# Patient Record
Sex: FEMALE | Race: WHITE | Employment: FULL TIME | URBAN - METROPOLITAN AREA
[De-identification: names, ages, dates, MRNs, and addresses within clinical notes are randomized per-mention and may not be internally consistent; named-entity substitution may affect disease eponyms.]

---

## 2021-01-29 ENCOUNTER — APPOINTMENT (OUTPATIENT)
Dept: GENERAL RADIOLOGY | Age: 31
End: 2021-01-29
Attending: PHYSICIAN ASSISTANT
Payer: OTHER GOVERNMENT

## 2021-01-29 ENCOUNTER — HOSPITAL ENCOUNTER (EMERGENCY)
Age: 31
Discharge: HOME | End: 2021-01-29
Attending: EMERGENCY MEDICINE
Payer: OTHER GOVERNMENT

## 2021-01-29 VITALS
SYSTOLIC BLOOD PRESSURE: 139 MMHG | RESPIRATION RATE: 18 BRPM | OXYGEN SATURATION: 96 % | WEIGHT: 155 LBS | HEART RATE: 103 BPM | HEIGHT: 64 IN | TEMPERATURE: 97.9 F | DIASTOLIC BLOOD PRESSURE: 83 MMHG | BODY MASS INDEX: 26.46 KG/M2

## 2021-01-29 DIAGNOSIS — R06.00 DYSPNEA, UNSPECIFIED TYPE: Primary | ICD-10-CM

## 2021-01-29 DIAGNOSIS — Z20.822 PERSON UNDER INVESTIGATION FOR COVID-19: ICD-10-CM

## 2021-01-29 LAB
ANION GAP SERPL CALC-SCNC: ABNORMAL MMOL/L (ref 3–18)
BASOPHILS # BLD: 0 K/UL (ref 0–0.1)
BASOPHILS NFR BLD: 0 % (ref 0–2)
BUN SERPL-MCNC: 16 MG/DL (ref 7–18)
BUN/CREAT SERPL: 18 (ref 12–20)
CALCIUM SERPL-MCNC: 8.8 MG/DL (ref 8.5–10.1)
CHLORIDE SERPL-SCNC: 106 MMOL/L (ref 100–111)
CO2 SERPL-SCNC: 35 MMOL/L (ref 21–32)
CREAT SERPL-MCNC: 0.88 MG/DL (ref 0.6–1.3)
D DIMER PPP FEU-MCNC: <0.27 UG/ML(FEU)
DIFFERENTIAL METHOD BLD: NORMAL
EOSINOPHIL # BLD: 0.2 K/UL (ref 0–0.4)
EOSINOPHIL NFR BLD: 2 % (ref 0–5)
ERYTHROCYTE [DISTWIDTH] IN BLOOD BY AUTOMATED COUNT: 12.1 % (ref 11.6–14.5)
GLUCOSE SERPL-MCNC: 88 MG/DL (ref 74–99)
HCT VFR BLD AUTO: 42.9 % (ref 35–45)
HGB BLD-MCNC: 14.6 G/DL (ref 12–16)
LIPASE SERPL-CCNC: 103 U/L (ref 73–393)
LYMPHOCYTES # BLD: 2.2 K/UL (ref 0.9–3.6)
LYMPHOCYTES NFR BLD: 28 % (ref 21–52)
MCH RBC QN AUTO: 30.7 PG (ref 24–34)
MCHC RBC AUTO-ENTMCNC: 34 G/DL (ref 31–37)
MCV RBC AUTO: 90.1 FL (ref 74–97)
MONOCYTES # BLD: 0.7 K/UL (ref 0.05–1.2)
MONOCYTES NFR BLD: 9 % (ref 3–10)
NEUTS SEG # BLD: 4.6 K/UL (ref 1.8–8)
NEUTS SEG NFR BLD: 61 % (ref 40–73)
PLATELET # BLD AUTO: 224 K/UL (ref 135–420)
PMV BLD AUTO: 10.3 FL (ref 9.2–11.8)
POTASSIUM SERPL-SCNC: 3.7 MMOL/L (ref 3.5–5.5)
RBC # BLD AUTO: 4.76 M/UL (ref 4.2–5.3)
SARS-COV-2, COV2: NORMAL
SODIUM SERPL-SCNC: 140 MMOL/L (ref 136–145)
WBC # BLD AUTO: 7.6 K/UL (ref 4.6–13.2)

## 2021-01-29 PROCEDURE — 93005 ELECTROCARDIOGRAM TRACING: CPT

## 2021-01-29 PROCEDURE — 83690 ASSAY OF LIPASE: CPT

## 2021-01-29 PROCEDURE — 71046 X-RAY EXAM CHEST 2 VIEWS: CPT

## 2021-01-29 PROCEDURE — 85025 COMPLETE CBC W/AUTO DIFF WBC: CPT

## 2021-01-29 PROCEDURE — U0003 INFECTIOUS AGENT DETECTION BY NUCLEIC ACID (DNA OR RNA); SEVERE ACUTE RESPIRATORY SYNDROME CORONAVIRUS 2 (SARS-COV-2) (CORONAVIRUS DISEASE [COVID-19]), AMPLIFIED PROBE TECHNIQUE, MAKING USE OF HIGH THROUGHPUT TECHNOLOGIES AS DESCRIBED BY CMS-2020-01-R: HCPCS

## 2021-01-29 PROCEDURE — 99284 EMERGENCY DEPT VISIT MOD MDM: CPT

## 2021-01-29 PROCEDURE — 80048 BASIC METABOLIC PNL TOTAL CA: CPT

## 2021-01-29 PROCEDURE — 85379 FIBRIN DEGRADATION QUANT: CPT

## 2021-01-29 NOTE — Clinical Note
[facility] [place] 
[facility] EMERGENCY DEPT 
[address] [address] [phone] Work/School Note Date: 1/29/2021 To Whom It May concern: 
 
Radha Davis was evaulated by the following provider(s): 
Attending Provider: Carmela Bearden DO Physician Assistant: Harjeet Galindo virus is suspected. Per the CDC guidelines we recommend home isolation until the following conditions are all met: 1. At least 10 days have passed since symptoms first appeared and 2. At least 24 hours have passed since last fever without the use of fever-reducing medications and 
3. Symptoms (e.g., cough, shortness of breath) have improved Sincerely, MIKY Campbell

## 2021-01-30 ENCOUNTER — PATIENT OUTREACH (OUTPATIENT)
Dept: CASE MANAGEMENT | Age: 31
End: 2021-01-30

## 2021-01-30 LAB
ATRIAL RATE: 79 BPM
CALCULATED P AXIS, ECG09: 67 DEGREES
CALCULATED R AXIS, ECG10: 57 DEGREES
CALCULATED T AXIS, ECG11: 41 DEGREES
DIAGNOSIS, 93000: NORMAL
P-R INTERVAL, ECG05: 142 MS
Q-T INTERVAL, ECG07: 388 MS
QRS DURATION, ECG06: 100 MS
QTC CALCULATION (BEZET), ECG08: 444 MS
VENTRICULAR RATE, ECG03: 79 BPM

## 2021-01-30 NOTE — PROGRESS NOTES
Patient contacted regarding [id number] Suspect. Discussed COVID-19 related testing which was pending at this time. Test results were pending. Patient informed of results, if available? n/a. Outreach made within 2 business days of discharge: Yes    LPN Care Coordinator contacted the patient by telephone to perform post discharge assessment. Verified name and  with patient as identifiers. Provided introduction to self, and explanation of LPN Care Coordinator role, and reason for call due to risk factors for infection and/or exposure to COVID-19. Symptoms reviewed with patient who verbalized the following symptoms: no new symptoms and no worsening symptoms. Denies SOB, chest pain, cough, chills, fever, body aches, wheezing, lightheadness/dizziness,HA, n/v/d at this time. Due to no new or worsening symptoms encounter was not routed to provider for escalation. Discussed follow-up appointments. If no appointment was previously scheduled, appointment scheduling offered: yes  [facility] follow up appointment(s): No future appointments. Non-[facility] follow up appointment(s): n/a    Patient encouraged to make an appointment with PCP for f/up. Advised to return to ED if symptoms worsen or fail to improve. Patients acknowledges understanding. Advance Care Planning:   Does patient have an Advance Directive: currently not on file; education provided     Patient has following risk factors of: no known risk factors     LPN reviewed discharge instructions, medical action plan and red flags such as increased shortness of breath, increasing fever and signs of decompensation with patient who verbalized understanding. Discussed exposure protocols and quarantine with CDC Guidelines What to do if you are sick with coronavirus disease .  Patient was given an opportunity for questions and concerns.  The patient agrees to contact the [facility] exposure line [phone], [facility] department JUAN M Acharya ([phone]) and PCP office for questions related to their healthcare. LPN provided contact information for future needs. Reviewed and educated patient on any new and changed medications related to discharge diagnosis. Patient/family/caregiver given information for [employer] and agrees to enroll yes  Patient's preferred e-mail:  n/a  Patient's preferred phone number: [phone]   Based on Loop alert triggers, patient will be contacted by nurse care manager for worsening symptoms. Pt will be further monitored by COVID Loop Team based on severity of symptoms and risk factors.

## 2021-01-30 NOTE — ACP (ADVANCE CARE PLANNING)
Advance Care Planning:   Does patient have an Advance Directive: currently not on file; education provided no

## 2021-01-30 NOTE — ED TRIAGE NOTES
Patient reports SOB for 2 weeks, worsening over the past 2 days.  Patient states she was seen at [facility] today for the same symptoms

## 2021-01-30 NOTE — ED PROVIDER NOTES
EMERGENCY DEPARTMENT HISTORY AND PHYSICAL EXAM    Date: 2021  Patient Name: Renzo Pérez    History of Presenting Illness     Chief Complaint   Patient presents with    Shortness of Breath         History Provided By: Patient    Chief Complaint: sob      Additional History (Context):   8:17 PM  Renzo Pérez is a 26 y.o. female presents to the emergency department C/O shortness of breath that she has noticed for the past 2 weeks worse over the past 2 days. Denies chest pain, cough, exposure to Covid, leg or calf tenderness or swelling. Patient states she was seen at [facility] earlier today but was not completely satisfied with her care and would like a second opinion. Specifically concerned about PE and would like a D-dimer. Also mentions that she did not have a chest x-ray or EKG. PCP: None        Past History     Past Medical History:  Past Medical History:   Diagnosis Date    Congenital adrenal cortical hyperplasia ([facility] [address]        Past Surgical History:  History reviewed. No pertinent surgical history. Family History:  History reviewed. No pertinent family history. Social History:  Social History     Tobacco Use    Smoking status: Former Smoker     Quit date: 2013     Years since quittin.0    Smokeless tobacco: Never Used   Substance Use Topics    Alcohol use: Not Currently    Drug use: Never       Allergies: Allergies   Allergen Reactions    Clindamycin Rash    Sulfa (Sulfonamide Antibiotics) Rash    Valtrex [Valacyclovir] Itching       Review of Systems   Review of Systems   Constitutional: Negative for chills and fever. Respiratory: Positive for shortness of breath. Cardiovascular: Negative for chest pain, palpitations and leg swelling. Gastrointestinal: Negative for abdominal pain. Musculoskeletal: Negative for back pain. Neurological: Negative for weakness and numbness. All other systems reviewed and are negative.       Physical Exam     Vitals:    21 2005 01/29/21 2039 01/29/21 2207   BP: 139/83     Pulse: (!) 103     Resp: 18     Temp: 97.9 °F (36.6 °C)     SpO2: 100% 98% 96%   Weight: 70.3 kg (155 lb)     Height: 5' 4\" (1.626 m)       Physical Exam  Vitals signs and nursing note reviewed. Constitutional:       Appearance: She is well-developed. Comments: alert, Well-appearing, nontoxic, no acute distress   HENT:      Head: Normocephalic and atraumatic. Neck:      Musculoskeletal: Normal range of motion and neck supple. Cardiovascular:      Rate and Rhythm: Normal rate and regular rhythm. Heart sounds: Normal heart sounds. No murmur. Pulmonary:      Effort: Pulmonary effort is normal. No respiratory distress. Breath sounds: Normal breath sounds. No wheezing or rales. Comments: Clear to auscultation bilaterally  Abdominal:      General: Bowel sounds are normal.      Palpations: Abdomen is soft. Tenderness: There is no abdominal tenderness. Musculoskeletal:      Comments: No calf tenderness or swelling bilaterally   Neurological:      Mental Status: She is alert and oriented to person, place, and time. Psychiatric:         Judgment: Judgment normal.         Diagnostic Study Results     Labs:     Recent Results (from the past 12 hour(s))   CBC WITH AUTOMATED DIFF    Collection Time: 01/29/21  9:18 PM   Result Value Ref Range    WBC 7.6 4.6 - 13.2 K/uL    RBC 4.76 4.20 - 5.30 M/uL    HGB 14.6 12.0 - 16.0 g/dL    HCT 42.9 35.0 - 45.0 %    MCV 90.1 74.0 - 97.0 FL    MCH 30.7 24.0 - 34.0 PG    MCHC 34.0 31.0 - 37.0 g/dL    RDW 12.1 11.6 - 14.5 %    PLATELET 122 139 - 006 K/uL    MPV 10.3 9.2 - 11.8 FL    NEUTROPHILS 61 40 - 73 %    LYMPHOCYTES 28 21 - 52 %    MONOCYTES 9 3 - 10 %    EOSINOPHILS 2 0 - 5 %    BASOPHILS 0 0 - 2 %    ABS. NEUTROPHILS 4.6 1.8 - 8.0 K/UL    ABS. LYMPHOCYTES 2.2 0.9 - 3.6 K/UL    ABS. MONOCYTES 0.7 0.05 - 1.2 K/UL    ABS. EOSINOPHILS 0.2 0.0 - 0.4 K/UL    ABS.  BASOPHILS 0.0 0.0 - 0.1 K/UL    DF AUTOMATED METABOLIC PANEL, BASIC    Collection Time: 01/29/21  9:18 PM   Result Value Ref Range    Sodium 140 136 - 145 mmol/L    Potassium 3.7 3.5 - 5.5 mmol/L    Chloride 106 100 - 111 mmol/L    CO2 35 (H) 21 - 32 mmol/L    Anion gap NEG 1 3.0 - 18 mmol/L    Glucose 88 74 - 99 mg/dL    BUN 16 7.0 - 18 MG/DL    Creatinine 0.88 0.6 - 1.3 MG/DL    BUN/Creatinine ratio 18 12 - 20      GFR est AA >60 >60 ml/min/1.73m2    GFR est non-AA >60 >60 ml/min/1.73m2    Calcium 8.8 8.5 - 10.1 MG/DL   D DIMER    Collection Time: 01/29/21  9:18 PM   Result Value Ref Range    D DIMER <0.27 <0.46 ug/ml(FEU)   LIPASE    Collection Time: 01/29/21  9:18 PM   Result Value Ref Range    Lipase 103 73 - 393 U/L   SARS-COV-2    Collection Time: 01/29/21 10:08 PM   Result Value Ref Range    SARS-CoV-2 Please find results under separate order         Radiologic Studies:   XR CHEST PA LAT   Final Result      No active cardiopulmonary disease. CT Results  (Last 48 hours)    None        CXR Results  (Last 48 hours)               01/29/21 2103  XR CHEST PA LAT Final result    Impression:      No active cardiopulmonary disease. Narrative:  EXAM:  XR CHEST PA LAT       CLINICAL INDICATION: sob   -Additional: None       COMPARISON: None       TECHNIQUE:  PA and Lateral views       ______________       FINDINGS:       HEART AND MEDIASTINUM: The heart size is normal.       LUNGS AND AIRWAYS: The lungs are clear. PLEURA: No pleural effusion or pneumothorax. BONES: No acute or aggressive osseous lesions. OTHER: None.       ______________                 Medical Decision Making   I am the first provider for this patient. I reviewed the vital signs, available nursing notes, past medical history, past surgical history, family history and social history. Vital Signs: Reviewed the patient's vital signs.     Pulse Oximetry Analysis: 98% on RA       EKG interpretation: (Preliminary)  8:17 PM   Normal sinus rhythm rate 79 bpm no STEMI  EKG read by Yessy Preciado PA-C     Records Reviewed: Nursing Notes and Old Medical Records    Procedures:  Procedures    ED Course:   8:17 PM Initial assessment performed. The patients presenting problems have been discussed, and they are in agreement with the care plan formulated and outlined with them. I have encouraged them to ask questions as they arise throughout their visit. Discussion:  Pt presents with shortness of breath this been going on for the past 2 weeks gradually worsening of the past 2 days. Patient was seen at an ER earlier today but was not satisfied with care and is concerned about PE.  EKG shows normal sinus rhythm x-ray shows no acute process. D-dimer is negative. covid pending. Instructed patient to self isolate until test results. Strict return precautions given, pt offering no questions or complaints. Diagnosis and Disposition     DISCHARGE NOTE:  Marco Melton  results have been reviewed with her. She has been counseled regarding her diagnosis, treatment, and plan. She verbally conveys understanding and agreement of the signs, symptoms, diagnosis, treatment and prognosis and additionally agrees to follow up as discussed. She also agrees with the care-plan and conveys that all of her questions have been answered. I have also provided discharge instructions for her that include: educational information regarding their diagnosis and treatment, and list of reasons why they would want to return to the ED prior to their follow-up appointment, should her condition change. She has been provided with education for proper emergency department utilization. CLINICAL IMPRESSION:    1. Dyspnea, unspecified type    2. Person under investigation for COVID-19        PLAN:  1. D/C Home  2. There are no discharge medications for this patient.     3.   Follow-up Information     Follow up With Specialties Details Why Contact Info    [address] [facility] [place]   or your pcp for follow up [id number] [facility] [address] [facility]    [facility] EMERGENCY DEPT Emergency Medicine  If symptoms worsen [address] [place] [id number]  [phone]              Please note that this dictation was completed with [employer], the computer voice recognition software. Quite often unanticipated grammatical, syntax, homophones, and other interpretive errors are inadvertently transcribed by the computer software. Please disregard these errors. Please excuse any errors that have escaped final proofreading.

## 2021-01-31 LAB — SARS-COV-2, COV2NT: NOT DETECTED

## 2021-02-16 ENCOUNTER — HOSPITAL ENCOUNTER (EMERGENCY)
Age: 31
Discharge: HOME | End: 2021-02-16
Attending: EMERGENCY MEDICINE
Payer: OTHER GOVERNMENT

## 2021-02-16 VITALS
RESPIRATION RATE: 15 BRPM | SYSTOLIC BLOOD PRESSURE: 145 MMHG | WEIGHT: 158 LBS | HEIGHT: 64 IN | OXYGEN SATURATION: 100 % | HEART RATE: 86 BPM | BODY MASS INDEX: 26.98 KG/M2 | DIASTOLIC BLOOD PRESSURE: 92 MMHG | TEMPERATURE: 97.8 F

## 2021-02-16 DIAGNOSIS — R59.9 REACTIVE LYMPHADENOPATHY: Primary | ICD-10-CM

## 2021-02-16 PROCEDURE — 99282 EMERGENCY DEPT VISIT SF MDM: CPT

## 2021-02-16 NOTE — ED TRIAGE NOTES
C/o posterior neck pain x 1 week.  Denies injury, states noticed swelling to right anterior neck today

## 2021-02-16 NOTE — ED PROVIDER NOTES
EMERGENCY DEPARTMENT HISTORY AND PHYSICAL EXAM    Date: 2021  Patient Name: Danielle Spaulding    History of Presenting Illness     Chief Complaint   Patient presents with    Neck Pain         History Provided By: Patient    Additional History (Context): Danielle Spaulding is a 26 y.o. female with no significant past medical history presents to the emergency department via private vehicle C/O tender enlarged lymph node to her anterior neck and posterior neck. States that she has noticed enlarged lymph nodes to the area the last week. States that she has seen her doctor on  for enlarged lymph nodes to her left axilla. Has a scheduled ultrasound tomorrow. Denies any recent fever or illness. Reports that her enlarged lymph nodes is causing her to have a headache. Does report congestion in her . pt denies weight loss, cough, nausea, vomiting, and any other sxs or complaints. No relieving or exacerbating factors identified. PCP: None        Past History     Past Medical History:  Past Medical History:   Diagnosis Date    Congenital adrenal cortical hyperplasia ([facility] [address]        Past Surgical History:  No past surgical history on file. Family History:  No family history on file. Social History:  Social History     Tobacco Use    Smoking status: Former Smoker     Quit date: 2013     Years since quittin.0    Smokeless tobacco: Never Used   Substance Use Topics    Alcohol use: Not Currently    Drug use: Never       Allergies: Allergies   Allergen Reactions    Clindamycin Rash    Sulfa (Sulfonamide Antibiotics) Rash    Valtrex [Valacyclovir] Itching         Review of Systems   Review of Systems   Constitutional: Negative for chills and fever. HENT: Negative for congestion, ear pain, sinus pain and sore throat. Eyes: Negative for pain and visual disturbance. Respiratory: Negative for cough and shortness of breath. Cardiovascular: Negative for chest pain and leg swelling. Gastrointestinal: Negative for abdominal pain, constipation, diarrhea, nausea and vomiting. Genitourinary: Negative for dysuria, hematuria, vaginal bleeding and vaginal discharge. Musculoskeletal: Positive for neck pain. Negative for back pain. Skin: Negative for rash and wound. Neurological: Positive for headaches. Negative for dizziness, tremors, weakness, light-headedness and numbness. Hematological: Positive for adenopathy. Does not bruise/bleed easily. All other systems reviewed and are negative. Physical Exam     Vitals:    02/16/21 0011   BP: (!) 145/92   Pulse: 86   Resp: 15   Temp: 97.8 °F (36.6 °C)   SpO2: 100%   Weight: 71.7 kg (158 lb)   Height: 5' 4\" (1.626 m)     Physical Exam    Nursing note and vitals reviewed    Constitutional: Anxious appearing young  female, nontoxic  Head: Normocephalic, Atraumatic  Eyes: Pupils are equal, round, and reactive to light, EOMI  Neck: Tender enlarged lymph node along her superficial cervical, posterior auricle and occipital chains. Supple, non-tender, no meningismus, negative Kernig, negative Brudzinski  Chest: Normal work of breathing and chest excursion bilaterally  Back: No evidence of trauma or deformity  Extremities: No evidence of trauma or deformity, no LE edema. No streaking erythema, vesicular lesions, ulcerations or bulla  Skin: Warm and dry, normal cap refill  Neuro: Alert and appropriate, CN intact, normal speech, moving all 4 extremities freely and symmetrically  Psychiatric: Anxious but cooperative       Diagnostic Study Results     Labs -   No results found for this or any previous visit (from the past 12 hour(s)). Radiologic Studies -   No orders to display     CT Results  (Last 48 hours)    None        CXR Results  (Last 48 hours)    None            Medical Decision Making   I am the first provider for this patient.     I reviewed the vital signs, available nursing notes, past medical history, past surgical history, family history and social history. Vital Signs-Reviewed the patient's vital signs. Pulse Oximetry Analysis -100% on room air    Cardiac Monitor:  Rate: 86 bpm  Rhythm: Regular    Records Reviewed: Nursing Notes and Old Medical Records    Provider Notes:   26 y.o. female presents with tender lymph nodes along her anterior and posterior neck. On exam the patient appears anxious but nontoxic. She is afebrile with appropriate vital signs. Patient with tender enlarged lymph nodes along her superficial cervical, posterior auricular and occipital chains. Discussed possibility of reactive lymphadenopathy with her tender lymph nodes. However offered CT imaging to rule out malignancy, although the patient has no other concerning symptoms, including weight loss, nausea, vomiting. Patient politely deferring any CT imaging. Would like to pursue ultrasound that is scheduled to evaluate her enlarged lymph nodes along her axilla tomorrow. Patient reassured. Discussed strict return precautions. Procedures:  Procedures    ED Course:   12:35 AM   Initial assessment performed. The patients presenting problems have been discussed, and they are in agreement with the care plan formulated and outlined with them. I have encouraged them to ask questions as they arise throughout their visit. Diagnosis and Disposition       DISCHARGE NOTE:  12:36 AM    Veena Swanson  results have been reviewed with her. She has been counseled regarding her diagnosis, treatment, and plan. She verbally conveys understanding and agreement of the signs, symptoms, diagnosis, treatment and prognosis and additionally agrees to follow up as discussed. She also agrees with the care-plan and conveys that all of her questions have been answered.   I have also provided discharge instructions for her that include: educational information regarding their diagnosis and treatment, and list of reasons why they would want to return to the ED prior to their follow-up appointment, should her condition change. She has been provided with education for proper emergency department utilization. CLINICAL IMPRESSION:    1. Reactive lymphadenopathy        PLAN:  1. D/C Home  2. There are no discharge medications for this patient. 3.   Follow-up Information     Follow up With Specialties Details Why [address] [place] [address] Internal Medicine at [facility] 224 an appointment as soon as possible for a visit in 2 days  [address] [place] [address]  [phone]    your ultrasound appt  Go in 1 day      [facility] EMERGENCY DEPT Emergency Medicine  As needed if symptoms worsen [address] [id number]  [phone]        ____________________________________     Please note that this dictation was completed with [employer], the computer voice recognition software. Quite often unanticipated grammatical, syntax, homophones, and other interpretive errors are inadvertently transcribed by the computer software. Please disregard these errors. Please excuse any errors that have escaped final proofreading.